# Patient Record
Sex: MALE | Race: WHITE
[De-identification: names, ages, dates, MRNs, and addresses within clinical notes are randomized per-mention and may not be internally consistent; named-entity substitution may affect disease eponyms.]

---

## 2017-03-24 ENCOUNTER — HOSPITAL ENCOUNTER (EMERGENCY)
Dept: HOSPITAL 63 - ER | Age: 32
Discharge: HOME | End: 2017-03-24
Payer: SELF-PAY

## 2017-03-24 VITALS — SYSTOLIC BLOOD PRESSURE: 140 MMHG | DIASTOLIC BLOOD PRESSURE: 85 MMHG

## 2017-03-24 VITALS — BODY MASS INDEX: 38.08 KG/M2 | HEIGHT: 71 IN | WEIGHT: 272 LBS

## 2017-03-24 DIAGNOSIS — A49.02: Primary | ICD-10-CM

## 2017-03-24 DIAGNOSIS — M79.671: ICD-10-CM

## 2017-03-24 DIAGNOSIS — Z88.8: ICD-10-CM

## 2017-03-24 PROCEDURE — 99283 EMERGENCY DEPT VISIT LOW MDM: CPT

## 2017-03-24 NOTE — PHYS DOC
Past History


Past Medical History:  Seizure


Past Surgical History:  No Surgical History


Alcohol Use:  None


Drug Use:  None





Adult General


Chief Complaint


Chief Complaint:  SKIN PROBLEM





HPI


HPI


30 -year-old male patient states he has had multiple bedbugs bites and 

complaining of redness and pain in his right foot since this morning and thinks 

he has infection of the bedbug bite. Patient denies fever and chills, pus 

drainage, sick contacts, history of MRSA.





Review of Systems


Review of Systems





Constitutional: Denies fever or chills []


Eyes: Denies change in visual acuity, redness, or eye pain []


HENT: Denies nasal congestion or sore throat []


Respiratory: Denies cough or shortness of breath []


Cardiovascular: No additional information not addressed in HPI []


GI: Denies abdominal pain, nausea, vomiting, bloody stools or diarrhea []


: Denies dysuria or hematuria []


Musculoskeletal: Denies back pain or joint pain []


Integument: See HPI


Neurologic: Denies headache, focal weakness or sensory changes []


Endocrine: Denies polyuria or polydipsia []





Allergies


Allergies





 Allergies








Coded Allergies Type Severity Reaction Last Updated Verified


 


  cortisone Allergy Unknown Rash 3/24/17 Yes











Physical Exam


Physical Exam





Constitutional: Mild distress, unkempt,non-toxic appearance. []


HENT: Normocephalic, atraumatic, bilateral external ears normal, oropharynx 

moist, no oral exudates, nose normal. []


Eyes: PERRLA, EOMI, conjunctiva normal, no discharge. [] 


Neck: Normal range of motion, no tenderness, supple, no stridor. [] 


Cardiovascular:Heart rate regular rhythm, no murmur []


Lungs & Thorax:  Bilateral breath sounds clear to auscultation []


Skin: Multiple bedbugs bites, right foot with 4 x 8 cm area of erythema and 

edema and tenderness without sign of abscess in dorsal and lateral of foot


Back: No tenderness, no CVA tenderness.right foot cellulitis [] 


Extremities: no cyanosis, no clubbing, ROM intact, no edema. [] 


Neurologic: Alert and oriented X 3, normal motor function, normal sensory 

function, no focal deficits noted. []


Psychologic: Affect normal, judgement normal, mood normal. []





Current Patient Data


Vital Signs





 Vital Signs








  Date Time  Temp Pulse Resp B/P Pulse Ox O2 Delivery O2 Flow Rate FiO2


 


3/24/17 10:46 97.9 98 18  98 Room Air  











EKG


EKG


[]





Radiology/Procedures


Radiology/Procedures


[]


Impressions:


Right foot cellulitis





Dragon Disclaimer


Dragon Disclaimer


This chart was dictated in whole or in part using Voice Recognition software in 

a busy, high-work load, and often noisy Emergency Department environment.  It 

may contain unintended and wholly unrecognized errors or omissions.





Departure


Departure:


Impression:  


 Primary Impression:  


 MRSA cellulitis of right foot


 Additional Impression:  


 Bed bug bite


Disposition:  01 HOME, SELF-CARE (At 1100)


Condition:  STABLE


Referrals:  


PCP,NO (PCP)


Patient Instructions:  Cellulitis, Community-Associated MRSA





Additional Instructions:


Keep wound clean and dry


Follow up with your doctor or return to ER in not getting better in 2 days


Scripts


Cephalexin (Keflex)500 Mg Capsule2 Cap PO BID #28 CAP


   Prov:CRISTI BERRY MD         3/24/17


Sulfamethoxazole/Trimethoprim (Bactrim Ds Tablet)1 Each Tablet1 Tab PO BID #14 

TAB


   Prov:CRISTI BERRY MD         3/24/17





Problem Qualifiers








CRISTI BERRY MD Mar 24, 2017 10:56

## 2017-08-18 ENCOUNTER — HOSPITAL ENCOUNTER (EMERGENCY)
Dept: HOSPITAL 63 - ER | Age: 32
Discharge: HOME | End: 2017-08-18
Payer: SELF-PAY

## 2017-08-18 ENCOUNTER — HOSPITAL ENCOUNTER (EMERGENCY)
Dept: HOSPITAL 63 - ER | Age: 32
Discharge: LEFT BEFORE DISCHARGE | End: 2017-08-18
Payer: SELF-PAY

## 2017-08-18 VITALS
WEIGHT: 280.65 LBS | SYSTOLIC BLOOD PRESSURE: 133 MMHG | DIASTOLIC BLOOD PRESSURE: 82 MMHG | HEIGHT: 71 IN | BODY MASS INDEX: 39.29 KG/M2

## 2017-08-18 VITALS
HEIGHT: 71 IN | SYSTOLIC BLOOD PRESSURE: 134 MMHG | WEIGHT: 280.65 LBS | DIASTOLIC BLOOD PRESSURE: 78 MMHG | BODY MASS INDEX: 39.29 KG/M2

## 2017-08-18 DIAGNOSIS — Z88.8: ICD-10-CM

## 2017-08-18 DIAGNOSIS — K04.7: Primary | ICD-10-CM

## 2017-08-18 PROCEDURE — 64400 NJX AA&/STRD TRIGEMINAL NRV: CPT

## 2017-08-18 PROCEDURE — 99281 EMR DPT VST MAYX REQ PHY/QHP: CPT

## 2017-08-18 NOTE — PHYS DOC
Past History


Past Medical History:  Seizure


Past Surgical History:  No Surgical History


Alcohol Use:  None


Drug Use:  None





Adult General


Chief Complaint


Chief Complaint:  DENTAL PROBLEM





HPI


HPI





Patient is a 32 year old M who presents with dental pain.





Review of Systems


Review of Systems





Constitutional: Denies fever or chills []


Eyes: Denies change in visual acuity, redness, or eye pain []


HENT: Denies nasal congestion or sore throat []


Respiratory: Denies cough or shortness of breath []


Cardiovascular: No additional information not addressed in HPI []


GI: Denies abdominal pain, nausea, vomiting, bloody stools or diarrhea []


: Denies dysuria or hematuria []


Musculoskeletal: Denies back pain or joint pain []


Integument: Denies rash or skin lesions []


Neurologic: Denies headache, focal weakness or sensory changes []


Endocrine: Denies polyuria or polydipsia []





Family History


Family History


Noncontributory





Current Medications


Current Medications


Medications reviewed





Allergies


Allergies





Allergies








Coded Allergies Type Severity Reaction Last Updated Verified


 


  cortisone Allergy Unknown Rash 3/24/17 Yes











Physical Exam


Physical Exam





Constitutional: Well developed, well nourished, no acute distress, non-toxic 

appearance. []


HENT: Normocephalic, atraumatic, bilateral external ears normal, oropharynx 

moist, no oral exudates. Very poor dental hygiene, multiple dental caries and 

fractures, fracture pre-molar noted in the upper left that is tender to 

palpation. Mild gum hypertrophy with out bleeding noted


Eyes: EOMI, conjunctiva normal, no discharge. [] 


Cardiovascular:Heart rate regular rhythm, no murmur []


Lungs & Thorax:  Bilateral breath sounds clear to auscultation []


Skin: Warm, dry, no erythema, no rash. [] 


Extremities: No tenderness, no cyanosis, no clubbing, ROM intact, no edema. [] 


Neurologic: Alert and oriented X 3, normal motor function, normal sensory 

function, no focal deficits noted. []


Psychologic: Affect normal, judgement normal, mood normal. []





Current Patient Data


Vital Signs


Please refer to nursing documentation. Normal vital signs





Radiology/Procedures


Radiology/Procedures


A dental block was performed using bupivacaine 0. 5% 1cc was placed in the left 

upper mouth between the lip and gum at the base of the affected tooth





Verbal consent was obtained prior to the procedure. Dez verbalized 

understanding that the procedure to reduce risk of infection and injury to 

surrounding tissue. Infection may be complicated death. Dez understands 

that the alternative is not to have this shot as this shot is strictly for pain 

control and not required for the treatment of his condition





Course & Med Decision Making


Course & Med Decision Making


Pertinent Labs and Imaging studies reviewed. (See chart for details)





Dez was seen earlier in the day for the same complaint.  At that time 

he refused antibiotics and left AGAINST MEDICAL ADVICE. He has returned and is 

willing to take antibiotics. He was started on oral antibiotics and advised 

follow potentially as soon as possible for further management





Dragon Disclaimer


Dragon Disclaimer


This chart was dictated in whole or in part using Voice Recognition software in 

a busy, high-work load, and often noisy Emergency Department environment.  It 

may contain unintended and wholly unrecognized errors or omissions.





Departure


Departure:


Impression:  


 Primary Impression:  


 Dental abscess


Disposition:  01 HOME, SELF-CARE


Condition:  STABLE


Referrals:  


PCP,NO (PCP)


Patient Instructions:  Dental Abscess





Additional Instructions:  


Dez was seen in the emergency room for dental pain. No emergency 

medical condition was found on history or physical exam. He was started on an 

oral antibiotic and was given a prescription to continue. He was advised to 

follow-up with his dentist as soon as possible for further management


Scripts


Amoxicillin/Potassium Clav (AUGMENTIN 875-125 TABLET) 1 Each Tablet


1 TAB PO BID for 14 Days, #28 TAB


   Prov: LEN VILLAR MD         8/18/17











LEN VILLAR MD Aug 18, 2017 06:04

## 2017-08-18 NOTE — PHYS DOC
Past History


Past Medical History:  Seizure


Past Surgical History:  No Surgical History


Alcohol Use:  None


Drug Use:  None





Adult General


Chief Complaint


Chief Complaint:  DENTAL PROBLEM





HPI


HPI





Patient is a 32 year old M who presents with dental pain.  Dez states 

that he had dental pain in his left upper molar starting approximately one week 

ago. He states that several days ago the tooth broke and the pain improved. He 

states that he grinds his teeth at night and tonight the pain woke him from 

sleep. He does take Dilantin which she states makes his gums bleed making it 

impossible for him to brush his teeth. He states that he has discussed this 

problem with his doctor and this is the only medication the has helped with his 

seizures.





Review of Systems


Review of Systems





Constitutional: Denies fever or chills []


Eyes: Denies change in visual acuity, redness, or eye pain []


HENT: Denies nasal congestion or sore throat []


Respiratory: Denies cough or shortness of breath []


Cardiovascular: No additional information not addressed in HPI []


GI: Denies abdominal pain, nausea, vomiting, bloody stools or diarrhea []


: Denies dysuria or hematuria []


Musculoskeletal: Denies back pain or joint pain []


Integument: Denies rash or skin lesions []


Neurologic: Denies headache, focal weakness or sensory changes []


Endocrine: Denies polyuria or polydipsia []





Family History


Family History


Noncontributory





Current Medications


Current Medications


Medications reviewed





Allergies


Allergies





Allergies








Coded Allergies Type Severity Reaction Last Updated Verified


 


  cortisone Allergy Unknown Rash 3/24/17 Yes











Physical Exam


Physical Exam





Constitutional: Well developed, well nourished, no acute distress, non-toxic 

appearance. []


HENT: Normocephalic, atraumatic, bilateral external ears normal, oropharynx 

moist, no oral exudates, nose normal. Poor dental hygiene, mouth gum 

hypertrophy without bleeding, fractured left upper tooth that is tender to 

palpation


Eyes: PERRLA, EOMI, conjunctiva normal, no discharge. [] 


Neck: Normal range of motion, no tenderness, supple, no stridor. [] 


Cardiovascular:Heart rate regular rhythm, no murmur []


Lungs & Thorax:  Bilateral breath sounds clear to auscultation []


Skin: Warm, dry, no erythema, no rash. [] 


Extremities: No tenderness, no cyanosis, no clubbing, ROM intact, no edema. [] 


Neurologic: Alert and oriented X 3, normal motor function, normal sensory 

function, no focal deficits noted. []


Psychologic: Affect normal, judgement normal, mood normal. []





Current Patient Data


Vital Signs





 Vital Signs








  Date Time  Temp Pulse Resp B/P (MAP) Pulse Ox O2 Delivery O2 Flow Rate FiO2


 


8/18/17 01:00 97.9 72 20  98 Room Air  











Course & Med Decision Making


Course & Med Decision Making


Pertinent Labs and Imaging studies reviewed. (See chart for details)





Dez was advised that his symptoms were consistent with a dental 

infection requiring antibiotics. He was advised to begin his antibiotics in the 

emergency room. He repeatedly declined oral antibiotics stating that he did not 

have the money to fill the prescription. He was advised that not treating 

dental infections may result in death. He verbalized understanding and 

continued to refuse antibiotics in the emergency room as well as a prescription 

for antibiotics. He states that he may be able to find help from a local [employer] 

and if he did he would return to the emergency room for prescription he was 

advised to take a paper prescription to fill so that he would be able to start 

antibiotics as soon as he was able to find financial help. He again declined. 

He left AGAINST MEDICAL ADVICE





Dragon Disclaimer


Dragon Disclaimer


This chart was dictated in whole or in part using Voice Recognition software in 

a busy, high-work load, and often noisy Emergency Department environment.  It 

may contain unintended and wholly unrecognized errors or omissions.





Departure


Departure:


Impression:  


 Primary Impression:  


 Tooth abscess


Disposition:  07 AGAINST MEDICAL ADVICE


Condition:  GUARDED


Referrals:  


PCP,NO (PCP)


Patient Instructions:  Dental Abscess





Additional Instructions:  


Dez was seen in the emergency room for dental pain. He was found to 

have a dental infection requiring antibiotics. He left AGAINST MEDICAL ADVICE 

without receiving antibiotics. He was advised that dental infections may result 

in worsening conditions including death. He did verbalize understanding and 

continued to decline antibiotics











LEN VILLAR MD Aug 18, 2017 01:33

## 2017-11-13 ENCOUNTER — HOSPITAL ENCOUNTER (EMERGENCY)
Dept: HOSPITAL 63 - ER | Age: 32
Discharge: HOME | End: 2017-11-13
Payer: SELF-PAY

## 2017-11-13 VITALS
SYSTOLIC BLOOD PRESSURE: 143 MMHG | SYSTOLIC BLOOD PRESSURE: 143 MMHG | DIASTOLIC BLOOD PRESSURE: 97 MMHG | DIASTOLIC BLOOD PRESSURE: 97 MMHG

## 2017-11-13 VITALS — BODY MASS INDEX: 39.29 KG/M2 | HEIGHT: 71 IN | WEIGHT: 280.65 LBS

## 2017-11-13 DIAGNOSIS — Z88.8: ICD-10-CM

## 2017-11-13 DIAGNOSIS — F17.210: ICD-10-CM

## 2017-11-13 DIAGNOSIS — L03.211: ICD-10-CM

## 2017-11-13 DIAGNOSIS — K04.7: Primary | ICD-10-CM

## 2017-11-13 PROCEDURE — 99283 EMERGENCY DEPT VISIT LOW MDM: CPT

## 2017-11-13 NOTE — PHYS DOC
General


Chief Complaint:  DENTAL PROBLEM


Stated Complaint:  DENTAL PAIN


Time Seen by MD:  19:43


Source:  patient


Exam Limitations:  no limitations


Problems:  





History of Present Illness


Initial Comments


Patient is a 32-year-old male who comes to the ED complaining of dental pain.


Patient states that he's had bad teeth for a long time. He says over the past 

week he's had worsening lower molar pain and today has some swelling lateral to 

his lower left mandible. He denies difficulty swallowing or breathing no 

intraoral tongue or throat swelling no neck stiffness coughing or hoarseness. 

He states he can't afford to see a dentist and denies fever chills nausea 

vomiting. I advised the patient we could take care of his symptoms but that he 

had to see a dentist for resolution of the condition. I advised him that we 

would not continue to treat chronic conditions in the emergency department for 

which no definitive treatment was sought. Patient denies bony tenderness he is 

afebrile vital signs are stable


Timing/Duration:  last week


Severity:  moderate


Location:  mouth, dental


Prearrival Treatment:  over the counter meds


Modifying Factors:  improves with other


Associated Symptoms:  facial pain/swelling, tooth pain


Allergies:  


Coded Allergies:  


     cortisone (Verified  Allergy, Unknown, Rash, 3/24/17)





Past Medical History


Medical History:  other


Surgical History:  noncontributory





Social History


Smoker:  cigarettes


Alcohol:  none


Drugs:  none





Constitutional:  denies chills, denies diaphoresis, denies fever, denies malaise


Ears:  denies dizziness, denies pain


Mouth:  see HPI


Throat:  denies pain, denies discharge, denies neck stiffness, denies painful 

swallowing, denies difficulty with fluids


Respiratory:  denies cough, denies shortness of breath


Cardiovascular:  denies chest pain, denies palpitations


Gastrointestinal:  denies nausea, denies vomiting


Musculoskeletal:  denies joint swelling, denies muscle pain, denies neck pain


Neurological:  denies headache, denies numbness, denies paresthesia





Physical Exam


General Appearance:  no apparent distress, obese


Nose:  normal inspection


Mouth/Throat:  other (left lower molar caries with gingival swelling, there are 

some swelling lateral to the lower mandible as well low bony tenderness or 

purulence noted airway is patent)


Neck:  non-tender, full range of motion


Cardiovascular/Respiratory:  normal peripheral pulses, no respiratory distress


Neurologic/Psychiatric:  CNs II-XII nml as tested, no motor/sensory deficits, 

alert, normal mood/affect, oriented x 3


Skin:  normal color, warm/dry





Departure


Time of Disposition:  19:52


Disposition:  01 HOME, SELF-CARE


Diagnosis:  dental abscess with facial cellulitis


Condition:  GOOD


Patient Instructions:  Dental Abscess





Additional Instructions:  


Listerine gargles three times daily after brushing/flossing.


OTC ibuprofen for baseline pain control.


Rx:  clindamycin, norco 5mg #15


Take meds with food.


Follow up with [facility] tomorrow for assistance getting in with a 

dentist.  May also consider [facility] resource.


The ED will not continue to refill meds for chronic conditions for which 

definitive care is not sought.


Return to ED with new or changing symptoms











LINETTE ESTRADA DO Nov 13, 2017 19:55

## 2020-08-20 ENCOUNTER — HOSPITAL ENCOUNTER (OUTPATIENT)
Dept: HOSPITAL 63 - DXRAD | Age: 35
Discharge: HOME | End: 2020-08-20
Payer: COMMERCIAL

## 2020-08-20 DIAGNOSIS — M25.561: Primary | ICD-10-CM

## 2020-08-20 PROCEDURE — 73560 X-RAY EXAM OF KNEE 1 OR 2: CPT

## 2020-08-20 NOTE — RAD
AP and lateral views right knee 

 8/20/2020 12:00 AM

 

Indication:  RIGHT KNEE PAIN  

 

Comparison: None

 

Findings: There is no acute fracture or dislocation. Articular surfaces 

are uninterupted and smooth. Soft tissues are unremarkable.

 

Impression: No evidence of acute osseous abnormality. 

 

Electronically signed by: Rocky Betts MD (8/20/2020 2:25 PM) [id number]

## 2021-03-26 ENCOUNTER — HOSPITAL ENCOUNTER (EMERGENCY)
Dept: HOSPITAL 63 - ER | Age: 36
Discharge: HOME | End: 2021-03-26
Payer: COMMERCIAL

## 2021-03-26 VITALS — WEIGHT: 315 LBS | BODY MASS INDEX: 42.66 KG/M2 | HEIGHT: 72 IN

## 2021-03-26 VITALS — DIASTOLIC BLOOD PRESSURE: 90 MMHG | SYSTOLIC BLOOD PRESSURE: 155 MMHG

## 2021-03-26 DIAGNOSIS — S90.02XA: ICD-10-CM
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
## 2021-03-26 DIAGNOSIS — R07.89: ICD-10-CM

## 2021-03-26 DIAGNOSIS — R07.81: ICD-10-CM

## 2021-03-26 DIAGNOSIS — S60.222A: Primary | ICD-10-CM

## 2021-03-26 DIAGNOSIS — S80.212A: ICD-10-CM

## 2021-03-26 DIAGNOSIS — R94.8: ICD-10-CM

## 2021-03-26 DIAGNOSIS — S90.01XA: ICD-10-CM
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
## 2021-03-26 DIAGNOSIS — Z88.8: ICD-10-CM
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
## 2021-03-26 LAB
ALBUMIN SERPL-MCNC: 3.7 G/DL (ref 3.4–5)
ALBUMIN/GLOB SERPL: 0.9 {RATIO} (ref 1–1.7)
ALP SERPL-CCNC: 133 U/L (ref 46–116)
ALT SERPL-CCNC: 35 U/L (ref 16–63)
ANION GAP SERPL CALC-SCNC: 12 MMOL/L (ref 6–14)
AST SERPL-CCNC: 18 U/L (ref 15–37)
BASOPHILS # BLD AUTO: 0 X10^3/UL (ref 0–0.2)
BASOPHILS NFR BLD: 0 % (ref 0–3)
BILIRUB SERPL-MCNC: 0.2 MG/DL (ref 0.2–1)
BUN/CREAT SERPL: 21 (ref 6–20)
CA-I SERPL ISE-MCNC: 17 MG/DL (ref 8–26)
CALCIUM SERPL-MCNC: 9.2 MG/DL (ref 8.5–10.1)
CHLORIDE SERPL-SCNC: 103 MMOL/L (ref 98–107)
CO2 SERPL-SCNC: 23 MMOL/L (ref 21–32)
CREAT SERPL-MCNC: 0.8 MG/DL (ref 0.7–1.3)
EOSINOPHIL NFR BLD: 0 % (ref 0–3)
EOSINOPHIL NFR BLD: 0 X10^3/UL (ref 0–0.7)
ERYTHROCYTE [DISTWIDTH] IN BLOOD BY AUTOMATED COUNT: 14 % (ref 11.5–14.5)
GFR SERPLBLD BASED ON 1.73 SQ M-ARVRAT: 108.8 ML/MIN
GLOBULIN SER-MCNC: 3.9 G/DL (ref 2.2–3.8)
GLUCOSE SERPL-MCNC: 104 MG/DL (ref 70–99)
HCT VFR BLD CALC: 43.4 % (ref 39–53)
HGB BLD-MCNC: 15 G/DL (ref 13–17.5)
LYMPHOCYTES # BLD: 1.6 X10^3/UL (ref 1–4.8)
LYMPHOCYTES NFR BLD AUTO: 17 % (ref 24–48)
MCH RBC QN AUTO: 31 PG (ref 25–35)
MCHC RBC AUTO-ENTMCNC: 35 G/DL (ref 31–37)
MCV RBC AUTO: 89 FL (ref 79–100)
MONO #: 0.7 X10^3/UL (ref 0–1.1)
MONOCYTES NFR BLD: 8 % (ref 0–9)
NEUT #: 6.8 X10^3UL (ref 1.8–7.7)
NEUTROPHILS NFR BLD AUTO: 75 % (ref 31–73)
PLATELET # BLD AUTO: 213 X10^3/UL (ref 140–400)
POTASSIUM SERPL-SCNC: 3.7 MMOL/L (ref 3.5–5.1)
PROT SERPL-MCNC: 7.6 G/DL (ref 6.4–8.2)
RBC # BLD AUTO: 4.88 X10^6/UL (ref 4.3–5.7)
SODIUM SERPL-SCNC: 138 MMOL/L (ref 136–145)
WBC # BLD AUTO: 9.1 X10^3/UL (ref 4–11)

## 2021-03-26 PROCEDURE — 85025 COMPLETE CBC W/AUTO DIFF WBC: CPT

## 2021-03-26 PROCEDURE — 90715 TDAP VACCINE 7 YRS/> IM: CPT

## 2021-03-26 PROCEDURE — 84484 ASSAY OF TROPONIN QUANT: CPT

## 2021-03-26 PROCEDURE — 73610 X-RAY EXAM OF ANKLE: CPT

## 2021-03-26 PROCEDURE — 72125 CT NECK SPINE W/O DYE: CPT

## 2021-03-26 PROCEDURE — 73110 X-RAY EXAM OF WRIST: CPT

## 2021-03-26 PROCEDURE — 71260 CT THORAX DX C+: CPT

## 2021-03-26 PROCEDURE — 90471 IMMUNIZATION ADMIN: CPT

## 2021-03-26 PROCEDURE — 73130 X-RAY EXAM OF HAND: CPT

## 2021-03-26 PROCEDURE — 85730 THROMBOPLASTIN TIME PARTIAL: CPT

## 2021-03-26 PROCEDURE — 70450 CT HEAD/BRAIN W/O DYE: CPT

## 2021-03-26 PROCEDURE — 36415 COLL VENOUS BLD VENIPUNCTURE: CPT

## 2021-03-26 PROCEDURE — 85610 PROTHROMBIN TIME: CPT

## 2021-03-26 PROCEDURE — 96374 THER/PROPH/DIAG INJ IV PUSH: CPT

## 2021-03-26 PROCEDURE — 93005 ELECTROCARDIOGRAM TRACING: CPT

## 2021-03-26 PROCEDURE — 80053 COMPREHEN METABOLIC PANEL: CPT

## 2021-03-26 PROCEDURE — 74177 CT ABD & PELVIS W/CONTRAST: CPT

## 2021-03-26 PROCEDURE — 29515 APPLICATION SHORT LEG SPLINT: CPT

## 2021-03-26 PROCEDURE — 99285 EMERGENCY DEPT VISIT HI MDM: CPT
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
## 2021-03-26 NOTE — EKG
[facility] [address], [place] [address]

Test Date:    2021               Test Time:    16:22:27

Pat Name:     LIA ARMSTRONG       Department:   

Patient ID:   [id number]           Room:          

Gender:       M                        Technician:   ZARI

:          1985               Requested By: GIOVANNI VILLANUEVA

Order Number: [id number]            Reading MD:     

                                 Measurements

Intervals                              Axis          

Rate:         98                       P:            21

TN:           164                      QRS:          -11

QRSD:         102                      T:            22

QT:           332                                    

QTc:          426                                    

                           Interpretive Statements

SINUS RHYTHM

LEFTWARD AXIS

INCOMPLETE RIGHT BUNDLE BRANCH BLOCK

OTHERWISE NORMAL ECG

RI6.02

No previous ECG available for comparison
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
